# Patient Record
Sex: FEMALE | Race: WHITE | NOT HISPANIC OR LATINO | URBAN - METROPOLITAN AREA
[De-identification: names, ages, dates, MRNs, and addresses within clinical notes are randomized per-mention and may not be internally consistent; named-entity substitution may affect disease eponyms.]

---

## 2017-08-11 ENCOUNTER — APPOINTMENT (RX ONLY)
Dept: URBAN - METROPOLITAN AREA CLINIC 12 | Facility: CLINIC | Age: 67
Setting detail: DERMATOLOGY
End: 2017-08-11

## 2017-08-11 DIAGNOSIS — Z41.9 ENCOUNTER FOR PROCEDURE FOR PURPOSES OTHER THAN REMEDYING HEALTH STATE, UNSPECIFIED: ICD-10-CM

## 2017-08-11 PROCEDURE — ? BOTOX

## 2017-08-11 PROCEDURE — ? FILLERS

## 2017-08-11 ASSESSMENT — LOCATION DETAILED DESCRIPTION DERM
LOCATION DETAILED: RIGHT INFERIOR MEDIAL MALAR CHEEK
LOCATION DETAILED: LEFT CENTRAL MALAR CHEEK
LOCATION DETAILED: RIGHT MEDIAL FOREHEAD
LOCATION DETAILED: RIGHT MEDIAL BUCCAL CHEEK
LOCATION DETAILED: LEFT INFERIOR MEDIAL MALAR CHEEK
LOCATION DETAILED: LEFT SUPERIOR PREAURICULAR CHEEK
LOCATION DETAILED: RIGHT CENTRAL MALAR CHEEK
LOCATION DETAILED: LEFT MEDIAL BUCCAL CHEEK
LOCATION DETAILED: RIGHT SUPERIOR PREAURICULAR CHEEK

## 2017-08-11 ASSESSMENT — LOCATION SIMPLE DESCRIPTION DERM
LOCATION SIMPLE: RIGHT FOREHEAD
LOCATION SIMPLE: LEFT CHEEK
LOCATION SIMPLE: RIGHT CHEEK

## 2017-08-11 ASSESSMENT — LOCATION ZONE DERM: LOCATION ZONE: FACE

## 2017-08-11 NOTE — PROCEDURE: FILLERS
Marionette Lines Filler  Volume In Cc: 0
Filler: Restylane-L
Consent: Written consent obtained. Risks include but not limited to bruising, beading, irregular texture, ulceration, infection, allergic reaction, scar formation, incomplete augmentation, temporary nature, procedural pain.
Lot #: [id number]
Additional Area 3 Location: Upper cutaneous lip
Additional Area 1 Location: inner canthus
Post-Care Instructions: Patient instructed to apply ice to reduce swelling.
Filler: Voluma
Expiration Date (Month Year): 9/2017
Nasolabial Folds Filler Volume In Cc: 1
Detail Level: Simple
Cheeks Filler Volume In Cc: 2
Topical Anesthesia?: BLT cream (benzocaine 20%, lidocaine 6%, tetracaine 4%)
Lot #: [id number]
Map Statment: See Attach Map for Complete Details
Use Map Statement For Sites (Optional): No
Lot #: [id number]
Additional Area 2 Location: Oral commisseurs
Expiration Date (Month Year): 12-6-2018
Anesthesia Type: 0.2% lidocaine (mixed within filler)
Additional Area 4 Location: Nose scar
Anesthesia Volume In Cc: 0.3
Expiration Date (Month Year): 7/2018

## 2017-08-11 NOTE — PROCEDURE: BOTOX
Additional Area 6 Units: 0
Forehead Units: 52
Lot #: [id number]
Dilution (U/0.1 Cc): 4
Post-Care Instructions: Patient instructed to not lie down for 4 hours and limit physical activity for 24 hours. Patient instructed not to travel by airplane for 48 hours.
Price (Use Numbers Only, No Special Characters Or $): 13
Detail Level: Simple
Expiration Date (Month Year): 03/2020
Consent: Written consent obtained. Risks include but not limited to lid/brow ptosis, bruising, swelling, diplopia, temporary effect, incomplete chemical denervation.

## 2019-05-09 ENCOUNTER — APPOINTMENT (RX ONLY)
Dept: URBAN - METROPOLITAN AREA CLINIC 12 | Facility: CLINIC | Age: 69
Setting detail: DERMATOLOGY
End: 2019-05-09

## 2019-05-09 DIAGNOSIS — Z41.9 ENCOUNTER FOR PROCEDURE FOR PURPOSES OTHER THAN REMEDYING HEALTH STATE, UNSPECIFIED: ICD-10-CM

## 2019-05-09 PROCEDURE — ? BOTOX

## 2019-05-09 PROCEDURE — ? FILLERS

## 2019-05-09 ASSESSMENT — LOCATION ZONE DERM
LOCATION ZONE: LIP
LOCATION ZONE: FACE

## 2019-05-09 ASSESSMENT — LOCATION DETAILED DESCRIPTION DERM
LOCATION DETAILED: LEFT LOWER CUTANEOUS LIP
LOCATION DETAILED: RIGHT INFERIOR MEDIAL MALAR CHEEK
LOCATION DETAILED: LEFT INFERIOR MEDIAL MALAR CHEEK
LOCATION DETAILED: LEFT CENTRAL MALAR CHEEK
LOCATION DETAILED: RIGHT SUPERIOR MEDIAL FOREHEAD
LOCATION DETAILED: RIGHT LOWER CUTANEOUS LIP
LOCATION DETAILED: RIGHT CENTRAL MALAR CHEEK

## 2019-05-09 ASSESSMENT — LOCATION SIMPLE DESCRIPTION DERM
LOCATION SIMPLE: RIGHT LIP
LOCATION SIMPLE: RIGHT CHEEK
LOCATION SIMPLE: RIGHT FOREHEAD
LOCATION SIMPLE: LEFT LIP
LOCATION SIMPLE: LEFT CHEEK

## 2019-05-09 NOTE — PROCEDURE: BOTOX
Additional Area 3 Units: 0
Post-Care Instructions: Patient instructed to not lie down for 4 hours and limit physical activity for 24 hours. Patient instructed not to travel by airplane for 48 hours.
Consent: Written consent obtained. Risks include but not limited to lid/brow ptosis, bruising, swelling, diplopia, temporary effect, incomplete chemical denervation.
Additional Area 1 Location: crows feet
Detail Level: Zone
Additional Area 2 Location: neck
Price (Use Numbers Only, No Special Characters Or $): 13
Lot #: [id number]
Forehead Units: 48
Dilution (U/0.1 Cc): 2.5
Expiration Date (Month Year): 10/21

## 2019-05-09 NOTE — PROCEDURE: FILLERS
Mid Face Filler  Volume In Cc: 0
Include Cannula Information In Note?: No
Post-Care Instructions: Patient instructed to apply ice to reduce swelling.
Consent: Written consent obtained. Risks include but not limited to bruising, beading, irregular texture, ulceration, infection, allergic reaction, scar formation, incomplete augmentation, temporary nature, procedural pain.
Additional Area 2 Location: lower lip
Filler Comments: 15% discount honored cosmetic day
Filler: Radiesse
Additional Area 1 Location: upper lip
Detail Level: Simple
Lot #: [id number]
Nasolabial Folds Filler Volume In Cc: 2
Anesthesia Volume In Cc: 0.5
Expiration Date (Month Year): 01-
Expiration Date (Month Year): 2020-03-18
Lot #: [id number]
Anesthesia Type: 1% lidocaine without epinephrine
Lot #: [id number]
Expiration Date (Month Year): 2021-06-28
Cheeks Filler Volume In Cc: 1
Additional Area 4 Location: Nose scar
Map Statment: See Attach Map for Complete Details
Additional Area 1 Location: upper cutaneous lip
Filler: Voluma

## 2021-09-23 ENCOUNTER — APPOINTMENT (RX ONLY)
Dept: URBAN - METROPOLITAN AREA CLINIC 12 | Facility: CLINIC | Age: 71
Setting detail: DERMATOLOGY
End: 2021-09-23

## 2021-09-23 DIAGNOSIS — Z41.9 ENCOUNTER FOR PROCEDURE FOR PURPOSES OTHER THAN REMEDYING HEALTH STATE, UNSPECIFIED: ICD-10-CM

## 2021-09-23 PROCEDURE — ? OTHER

## 2021-09-23 PROCEDURE — ? BOTOX

## 2021-09-23 PROCEDURE — ? FILLERS

## 2021-09-23 ASSESSMENT — LOCATION DETAILED DESCRIPTION DERM
LOCATION DETAILED: LEFT CENTRAL MALAR CHEEK
LOCATION DETAILED: RIGHT UPPER CUTANEOUS LIP
LOCATION DETAILED: LEFT LOWER CUTANEOUS LIP
LOCATION DETAILED: RIGHT INFERIOR LATERAL BUCCAL CHEEK
LOCATION DETAILED: LEFT CHIN
LOCATION DETAILED: RIGHT LOWER CUTANEOUS LIP
LOCATION DETAILED: LEFT UPPER CUTANEOUS LIP
LOCATION DETAILED: RIGHT CENTRAL MALAR CHEEK

## 2021-09-23 ASSESSMENT — LOCATION SIMPLE DESCRIPTION DERM
LOCATION SIMPLE: LEFT CHEEK
LOCATION SIMPLE: CHIN
LOCATION SIMPLE: RIGHT LIP
LOCATION SIMPLE: RIGHT CHEEK
LOCATION SIMPLE: LEFT LIP

## 2021-09-23 ASSESSMENT — LOCATION ZONE DERM
LOCATION ZONE: LIP
LOCATION ZONE: FACE

## 2021-09-23 NOTE — PROCEDURE: OTHER
Detail Level: Zone
Other (Free Text): 15% discount honored for cosmetic event day
Note Text (......Xxx Chief Complaint.): This diagnosis correlates with the
Render Risk Assessment In Note?: no

## 2021-09-23 NOTE — PROCEDURE: BOTOX
Show Levator Superior Units: Yes
Mentalis Units: 0
Detail Level: Simple
Lot #: [id number]
Expiration Date (Month Year): 11/2023
Show Right And Left Pupillary Line Units: No
Consent: Written consent obtained. Risks include but not limited to lid/brow ptosis, bruising, swelling, diplopia, temporary effect, incomplete chemical denervation.
Forehead Units: 48
Additional Area 2 Location: neck
Additional Area 1 Location: chin
Dilution (U/0.1 Cc): 4
Post-Care Instructions: Patient instructed to not lie down for 4 hours and limit physical activity for 24 hours. Patient instructed not to travel by airplane for 48 hours.

## 2021-09-23 NOTE — PROCEDURE: FILLERS
Jawline Filler Volume In Cc: 0
Use Map Statement For Sites (Optional): No
Filler: Radiesse
Lot #: [id number]
Jawline Filler Volume In Cc: 0.5
Nasolabial Folds Filler Volume In Cc: 1
Expiration Date (Month Year): 2022-05-17
Map Statment: See Attach Map for Complete Details
Lot #: [id number]
Topical Anesthesia?: BLT ointment (benzocaine 20%, lidocaine 10%, tetracaine 10%)
Detail Level: Zone
Additional Area 1 Location: upper lip
Expiration Date (Month Year): 2024/04/08
Lot #: [id number]
Additional Area 2 Location: lower lip
Anesthesia Expiration Date (Month Year): 09/2021
Filler: Juvederm Voluma XC
Additional Area 3 Location: chin
Consent: Written consent obtained. Risks include but not limited to bruising, beading, irregular texture, ulceration, infection, allergic reaction, scar formation, incomplete augmentation, temporary nature, procedural pain.
Post-Care Instructions: Patient instructed to apply ice to reduce swelling.
Expiration Date (Month Year): 01-

## 2021-09-30 ENCOUNTER — APPOINTMENT (RX ONLY)
Dept: URBAN - METROPOLITAN AREA CLINIC 12 | Facility: CLINIC | Age: 71
Setting detail: DERMATOLOGY
End: 2021-09-30

## 2021-09-30 DIAGNOSIS — Z41.9 ENCOUNTER FOR PROCEDURE FOR PURPOSES OTHER THAN REMEDYING HEALTH STATE, UNSPECIFIED: ICD-10-CM

## 2021-09-30 PROCEDURE — ? FILLERS

## 2021-09-30 PROCEDURE — ? OTHER

## 2021-09-30 ASSESSMENT — LOCATION SIMPLE DESCRIPTION DERM
LOCATION SIMPLE: RIGHT LIP
LOCATION SIMPLE: LEFT CHEEK
LOCATION SIMPLE: LEFT CHEEK
LOCATION SIMPLE: RIGHT CHEEK

## 2021-09-30 ASSESSMENT — LOCATION DETAILED DESCRIPTION DERM
LOCATION DETAILED: LEFT MEDIAL BUCCAL CHEEK
LOCATION DETAILED: RIGHT MEDIAL BUCCAL CHEEK
LOCATION DETAILED: RIGHT LOWER CUTANEOUS LIP
LOCATION DETAILED: LEFT MEDIAL BUCCAL CHEEK

## 2021-09-30 ASSESSMENT — LOCATION ZONE DERM
LOCATION ZONE: LIP
LOCATION ZONE: FACE
LOCATION ZONE: FACE

## 2021-09-30 NOTE — PROCEDURE: FILLERS
Map Statment: See Attach Map for Complete Details
Additional Area 4 Volume In Cc: 0
Include Cannula Information In Note?: No
Expiration Date (Month Year): 01-
Post-Care Instructions: Patient instructed to apply ice to reduce swelling.
Lot #: [id number]
Lot #: [id number]
Topical Anesthesia?: BLT gel (benzocaine 20%, lidocaine 10%, tetracaine 10%)
Expiration Date (Month Year): 05/09/2022
Additional Area 1 Location: upper lip
Expiration Date (Month Year): 2021/12/22
Detail Level: Zone
Filler: Juvederm Ultra Plus XC
Price (Use Numbers Only, No Special Characters Or $): 649
Lot #: [id number]
Additional Area 2 Location: lower lip
Nasolabial Folds Filler Volume In Cc: 1
Additional Area 3 Location: chin
Consent: Written consent obtained. Risks include but not limited to bruising, beading, irregular texture, ulceration, infection, allergic reaction, scar formation, incomplete augmentation, temporary nature, procedural pain.

## 2021-09-30 NOTE — PROCEDURE: OTHER
Render Risk Assessment In Note?: no
Note Text (......Xxx Chief Complaint.): This diagnosis correlates with the
Other (Free Text): 15% discount honored for cosmetic event
Detail Level: Zone

## 2021-11-18 ENCOUNTER — APPOINTMENT (RX ONLY)
Dept: URBAN - METROPOLITAN AREA CLINIC 12 | Facility: CLINIC | Age: 71
Setting detail: DERMATOLOGY
End: 2021-11-18

## 2021-11-18 DIAGNOSIS — Z41.9 ENCOUNTER FOR PROCEDURE FOR PURPOSES OTHER THAN REMEDYING HEALTH STATE, UNSPECIFIED: ICD-10-CM

## 2021-11-18 PROCEDURE — ? BOTOX

## 2021-11-18 PROCEDURE — ? FILLERS

## 2021-11-18 ASSESSMENT — LOCATION SIMPLE DESCRIPTION DERM
LOCATION SIMPLE: INFERIOR FOREHEAD
LOCATION SIMPLE: RIGHT CHEEK
LOCATION SIMPLE: LEFT CHEEK

## 2021-11-18 ASSESSMENT — LOCATION DETAILED DESCRIPTION DERM
LOCATION DETAILED: INFERIOR MID FOREHEAD
LOCATION DETAILED: LEFT CENTRAL BUCCAL CHEEK
LOCATION DETAILED: RIGHT CENTRAL BUCCAL CHEEK
LOCATION DETAILED: RIGHT LATERAL MALAR CHEEK
LOCATION DETAILED: LEFT SUPERIOR LATERAL MALAR CHEEK
LOCATION DETAILED: LEFT LATERAL BUCCAL CHEEK
LOCATION DETAILED: RIGHT CENTRAL MALAR CHEEK
LOCATION DETAILED: LEFT LATERAL MALAR CHEEK

## 2021-11-18 ASSESSMENT — LOCATION ZONE DERM: LOCATION ZONE: FACE

## 2021-11-18 NOTE — PROCEDURE: BOTOX
Glabellar Complex Units: 0
Expiration Date (Month Year): 02/2024
Show Glabellar Units: Yes
Show Mentalis Units: No
Additional Area 1 Location: chin
Detail Level: Simple
Dilution (U/0.1 Cc): 4
Post-Care Instructions: Patient instructed to not lie down for 4 hours and limit physical activity for 24 hours. Patient instructed not to travel by airplane for 48 hours.
Additional Area 2 Location: neck
Lot #: [id number]
Forehead Units: 48
Consent: Written consent obtained. Risks include but not limited to lid/brow ptosis, bruising, swelling, diplopia, temporary effect, incomplete chemical denervation.
Topical Anesthesia?: BLT cream (benzocaine 20%, lidocaine 10%, tetracaine 10%)

## 2021-11-18 NOTE — PROCEDURE: FILLERS
Vermilion Lips Filler Volume In Cc: 0
Lot #: [id number]
Include Cannula Information In Note?: No
Expiration Date (Month Year): 2022-10-17
Marionette Lines Filler  Volume In Cc: 1
Lot #: [id number]
Consent: Written consent obtained. Risks include but not limited to bruising, beading, irregular texture, ulceration, infection, allergic reaction, scar formation, incomplete augmentation, temporary nature, procedural pain.
Lot #: [id number]
Post-Care Instructions: Patient instructed to apply ice to reduce swelling.
Map Statment: See Attach Map for Complete Details
Topical Anesthesia?: BLT gel (benzocaine 20%, lidocaine 10%, tetracaine 10%)
Expiration Date (Month Year): 2022-11-10
Expiration Date (Month Year): 2022-07-26
Additional Area 1 Location: upper lip
Include Cannula Information In Note?: Yes
Additional Area 2 Location: lower lip
Include Cannula Size?: 25G
Filler: Juvederm Voluma XC
Additional Area 1 Location: chin
Include Cannula Length?: 1.5 inch
Detail Level: Zone
Filler: Juvederm Ultra Plus XC

## 2022-03-28 ENCOUNTER — APPOINTMENT (RX ONLY)
Dept: URBAN - METROPOLITAN AREA CLINIC 12 | Facility: CLINIC | Age: 72
Setting detail: DERMATOLOGY
End: 2022-03-28

## 2022-03-28 DIAGNOSIS — Z41.9 ENCOUNTER FOR PROCEDURE FOR PURPOSES OTHER THAN REMEDYING HEALTH STATE, UNSPECIFIED: ICD-10-CM

## 2022-03-28 PROCEDURE — ? FILLERS

## 2022-03-28 PROCEDURE — ? BOTOX

## 2022-03-28 ASSESSMENT — LOCATION DETAILED DESCRIPTION DERM
LOCATION DETAILED: SUPERIOR MID FOREHEAD
LOCATION DETAILED: RIGHT INFERIOR MEDIAL MALAR CHEEK
LOCATION DETAILED: RIGHT CENTRAL MALAR CHEEK
LOCATION DETAILED: LEFT CENTRAL MALAR CHEEK
LOCATION DETAILED: LEFT UPPER CUTANEOUS LIP

## 2022-03-28 ASSESSMENT — LOCATION ZONE DERM
LOCATION ZONE: LIP
LOCATION ZONE: FACE

## 2022-03-28 ASSESSMENT — LOCATION SIMPLE DESCRIPTION DERM
LOCATION SIMPLE: SUPERIOR FOREHEAD
LOCATION SIMPLE: LEFT CHEEK
LOCATION SIMPLE: RIGHT CHEEK
LOCATION SIMPLE: LEFT LIP

## 2022-03-28 NOTE — PROCEDURE: FILLERS
Tear Troughs Filler  Volume In Cc: 0
Include Cannula Information In Note?: Yes
Include Cannula Length?: 1.5 inch
Additional Area 3 Location: glabella
Filler: Juvederm Ultra Plus XC
Filler: Juvederm Voluma XC
Detail Level: Zone
Include Cannula Size?: 25G
Cheeks Filler Volume In Cc: 1
Lot #: [id number]
Expiration Date (Month Year): 2023/01/25
Consent: Written consent obtained. Risks include but not limited to bruising, beading, irregular texture, ulceration, infection, allergic reaction, scar formation, incomplete augmentation, temporary nature, procedural pain.
Price (Use Numbers Only, No Special Characters Or $): 1200
Lot #: [id number]
Post-Care Instructions: Patient instructed to apply ice to reduce swelling.
Include Cannula Information In Note?: No
Expiration Date (Month Year): 2022/07/26
Additional Area 1 Location: upper lip
Additional Area 2 Location: jawline
Additional Area 1 Location: chin
Lot #: [id number]
Map Statment: See Attach Map for Complete Details
Additional Area 3 Location: nasal dorsum
Expiration Date (Month Year): 2022-11-10

## 2022-03-28 NOTE — PROCEDURE: BOTOX
Show Anterior Platysmal Band Units: Yes
Left Periorbital Units: 0
Additional Area 1 Location: chin
Forehead Units: 48
Post-Care Instructions: Patient instructed to not lie down for 4 hours and limit physical activity for 24 hours. Patient instructed not to travel by airplane for 48 hours.
Expiration Date (Month Year): 04/2024
Show Ucl Units: No
Detail Level: Simple
Lot #: [id number]
Price (Use Numbers Only, No Special Characters Or $): 13.00
Dilution (U/0.1 Cc): 2.5
Consent: Written consent obtained. Risks include but not limited to lid/brow ptosis, bruising, swelling, diplopia, temporary effect, incomplete chemical denervation.
Additional Area 2 Location: neck

## 2022-06-14 ENCOUNTER — APPOINTMENT (RX ONLY)
Dept: URBAN - METROPOLITAN AREA CLINIC 12 | Facility: CLINIC | Age: 72
Setting detail: DERMATOLOGY
End: 2022-06-14

## 2022-06-14 DIAGNOSIS — Z41.9 ENCOUNTER FOR PROCEDURE FOR PURPOSES OTHER THAN REMEDYING HEALTH STATE, UNSPECIFIED: ICD-10-CM

## 2022-06-14 PROCEDURE — ? OTHER

## 2022-06-14 PROCEDURE — ? BOTOX

## 2022-06-14 PROCEDURE — ? FILLERS

## 2022-06-14 ASSESSMENT — LOCATION DETAILED DESCRIPTION DERM: LOCATION DETAILED: INFERIOR MID FOREHEAD

## 2022-06-14 ASSESSMENT — LOCATION ZONE DERM: LOCATION ZONE: FACE

## 2022-06-14 ASSESSMENT — LOCATION SIMPLE DESCRIPTION DERM: LOCATION SIMPLE: INFERIOR FOREHEAD

## 2022-06-14 NOTE — PROCEDURE: FILLERS
Include Cannula Length?: 1.5 inch
Jawline Filler Volume In Cc: 0
Price (Use Numbers Only, No Special Characters Or $): 786
Additional Area 1 Location: chin
Include Cannula Information In Note?: No
Consent: Written consent obtained. Risks include but not limited to bruising, beading, irregular texture, ulceration, infection, allergic reaction, scar formation, incomplete augmentation, temporary nature, procedural pain.
Include Cannula Size?: 25G
Use Map Statement For Sites (Optional): Yes
Additional Area 2 Location: upper lip
Anesthesia Type: 1% lidocaine with 1:100,000 epinephrine and 408mcg clindamycin/ml and a 1:10 solution of 8.4% sodium bicarbonate
Post-Care Instructions: Patient instructed to apply ice to reduce swelling.
Map Statment: See Attach Map for Complete Details
Additional Area 3 Location: glabella
Filler: Juvederm Voluma XC
Lateral Face Filler  Volume In Cc: 1
Anesthesia Volume In Cc: 3
Filler: Juvederm Ultra XC
Lot #: [id number]
Expiration Date (Month Year): 2023-03-17
Aspiration Statement: Aspiration was performed prior to injecting site with filler.
Lot #: [id number]
Additional Area 2 Location: jawline
Lot #: [id number]
Detail Level: Zone
Additional Area 3 Location: nasal dorsum
Expiration Date (Month Year): 2022-12-22

## 2022-06-14 NOTE — PROCEDURE: BOTOX
Additional Area 2 Units: 0
Show Anterior Platysmal Band Units: Yes
Detail Level: Zone
Additional Area 1 Location: chin
Consent: Written consent obtained. Risks include but not limited to lid/brow ptosis, bruising, swelling, diplopia, temporary effect, incomplete chemical denervation.
Show Lcl Units: No
Additional Area 2 Location: neck
Forehead Units: 54
Expiration Date (Month Year): 04/2024
Lot #: [id number]
Dilution (U/0.1 Cc): 2.5
Post-Care Instructions: Patient instructed to not lie down for 4 hours and limit physical activity for 24 hours. Patient instructed not to travel by airplane for 48 hours.

## 2022-06-14 NOTE — PROCEDURE: OTHER
Other (Free Text): $12/ unit honored
Render Risk Assessment In Note?: no
Note Text (......Xxx Chief Complaint.): This diagnosis correlates with the
Detail Level: Zone

## 2022-08-19 ENCOUNTER — APPOINTMENT (RX ONLY)
Dept: URBAN - METROPOLITAN AREA CLINIC 12 | Facility: CLINIC | Age: 72
Setting detail: DERMATOLOGY
End: 2022-08-19

## 2022-08-19 DIAGNOSIS — Z41.9 ENCOUNTER FOR PROCEDURE FOR PURPOSES OTHER THAN REMEDYING HEALTH STATE, UNSPECIFIED: ICD-10-CM

## 2022-08-19 PROCEDURE — ? FILLERS

## 2022-08-19 PROCEDURE — ? BOTOX

## 2022-08-19 NOTE — PROCEDURE: BOTOX
Post-Care Instructions: Patient instructed to not lie down for 4 hours and limit physical activity for 24 hours. Patient instructed not to travel by airplane for 48 hours.
Detail Level: Zone
Show Lateral Platysmal Band Units: Yes
Additional Area 5 Units: 0
Show Lcl Units: No
Additional Area 1 Location: chin
Expiration Date (Month Year): 03/2024
Lot #: [id number]
Price (Use Numbers Only, No Special Characters Or $): 182
Additional Area 2 Location: neck
Dilution (U/0.1 Cc): 2.5
Forehead Units: 52
Consent: Written consent obtained. Risks include but not limited to lid/brow ptosis, bruising, swelling, diplopia, temporary effect, incomplete chemical denervation.

## 2022-08-19 NOTE — PROCEDURE: FILLERS
Decollete Filler  Volume In Cc: 0
Include Documentation That Aspiration Was Performed Prior To Injecting Filler:: Yes
Lot #: [id number]
Additional Area 1 Location: Chin
Include Cannula Size?: 25G
Expiration Date (Month Year): 2023/07/22
Additional Area 2 Location: jawline
Lot #: [id number]
Filler: Juvederm Voluma XC
Additional Area 3 Location: nasal dorsum
Expiration Date (Month Year): 2022-11-10
Include Cannula Length?: 1.5 inch
Include Cannula Information In Note?: No
Detail Level: Zone
Cheeks Filler Volume In Cc: 1
Aspiration Statement: Aspiration was performed prior to injecting site with filler.
Additional Area 4 Location: upper lip
Consent: Written consent obtained. Risks include but not limited to bruising, beading, irregular texture, ulceration, infection, allergic reaction, scar formation, incomplete augmentation, temporary nature, procedural pain.
Price (Use Numbers Only, No Special Characters Or $): 557
Post-Care Instructions: Patient instructed to apply ice to reduce swelling.
Filler: Juvederm Ultra Plus XC
Lot #: [id number]
Additional Area 3 Location: glabella
Expiration Date (Month Year): 07/23/2023
Map Statment: See Attach Map for Complete Details

## 2022-11-11 ENCOUNTER — APPOINTMENT (RX ONLY)
Dept: URBAN - METROPOLITAN AREA CLINIC 12 | Facility: CLINIC | Age: 72
Setting detail: DERMATOLOGY
End: 2022-11-11

## 2022-11-11 DIAGNOSIS — L98.8 OTHER SPECIFIED DISORDERS OF THE SKIN AND SUBCUTANEOUS TISSUE: ICD-10-CM

## 2022-11-11 DIAGNOSIS — Z41.9 ENCOUNTER FOR PROCEDURE FOR PURPOSES OTHER THAN REMEDYING HEALTH STATE, UNSPECIFIED: ICD-10-CM

## 2022-11-11 PROCEDURE — ? BOTOX

## 2022-11-11 PROCEDURE — ? FILLERS

## 2022-11-11 ASSESSMENT — LOCATION ZONE DERM
LOCATION ZONE: NOSE
LOCATION ZONE: LIP
LOCATION ZONE: EYELID
LOCATION ZONE: FACE

## 2022-11-11 ASSESSMENT — LOCATION DETAILED DESCRIPTION DERM
LOCATION DETAILED: RIGHT UPPER CUTANEOUS LIP
LOCATION DETAILED: NASAL ROOT
LOCATION DETAILED: RIGHT INFERIOR CENTRAL BUCCAL CHEEK
LOCATION DETAILED: LEFT LATERAL BUCCAL CHEEK
LOCATION DETAILED: LEFT LATERAL EYEBROW
LOCATION DETAILED: GLABELLA
LOCATION DETAILED: LEFT UPPER CUTANEOUS LIP
LOCATION DETAILED: RIGHT SUPERIOR MEDIAL BUCCAL CHEEK
LOCATION DETAILED: SUPERIOR MID FOREHEAD
LOCATION DETAILED: RIGHT LATERAL CANTHUS
LOCATION DETAILED: LEFT LATERAL CANTHUS
LOCATION DETAILED: LEFT INFERIOR MEDIAL MALAR CHEEK
LOCATION DETAILED: LEFT CHIN
LOCATION DETAILED: RIGHT LATERAL EYEBROW

## 2022-11-11 ASSESSMENT — LOCATION SIMPLE DESCRIPTION DERM
LOCATION SIMPLE: CHIN
LOCATION SIMPLE: GLABELLA
LOCATION SIMPLE: LEFT EYELID
LOCATION SIMPLE: LEFT CHEEK
LOCATION SIMPLE: RIGHT LIP
LOCATION SIMPLE: RIGHT EYELID
LOCATION SIMPLE: LEFT EYEBROW
LOCATION SIMPLE: RIGHT CHEEK
LOCATION SIMPLE: NOSE
LOCATION SIMPLE: LEFT LIP
LOCATION SIMPLE: SUPERIOR FOREHEAD
LOCATION SIMPLE: RIGHT EYEBROW

## 2022-11-11 NOTE — PROCEDURE: FILLERS
Tear Troughs Filler  Volume In Cc: 0
Lot #: [id number]
Aspiration Statement: Aspiration was performed prior to injecting site with filler.
Additional Area 1 Location: glabella
Expiration Date (Month Year): 6/22/23
Include Cannula Information In Note?: No
Include Cannula Information In Note?: Yes
Additional Area 2 Location: Forehead
Include Cannula Size?: 25G
Detail Level: Simple
Filler: Juvederm Voluma XC
Expiration Date (Month Year): 4/30/22
Additional Area 3 Location: hands
Include Cannula Length?: 1.5 inch
Price (Use Numbers Only, No Special Characters Or $): 3700
Additional Area 1 Location: chin
Lot #: [id number]
Additional Area 4 Volume In Cc: 0.5
Nasolabial Folds Filler Volume In Cc: 1
Additional Area 5 Location: Upper Cutaneous Lip
Anesthesia Type: 1% lidocaine without epinephrine
Filler: Juvederm Vollure XC
Lot #: [id number]
Map Statment: See attached Map
Expiration Date (Month Year): 8/4/23

## 2022-11-11 NOTE — PROCEDURE: BOTOX
Show Additional Area 2: Yes
Left Periorbital Units: 0
Show Lcl Units: No
Additional Area 5 Location: Ailin
Expiration Date (Month Year): 08/2024
Forehead Units: 10
Glabellar Complex Units: 16
Additional Area 1 Location: Eyebrows
Price (Use Numbers Only, No Special Characters Or $): 12
Additional Area 6 Location: Chin Zendejas
Consent: Written consent obtained. Risks include but not limited to lid/brow ptosis, bruising, swelling, diplopia, temporary effect, incomplete chemical denervation.
Detail Level: Simple
Post-Care Instructions: Patient instructed to not lie down for 4 hours and limit physical activity for 24 hours. Patient instructed not to travel by airplane for 48 hours.
Additional Area 2 Location: Neck
Additional Area 6 Units: 4
Lot #: [id number]
Additional Area 3 Location: upper cutaneous lip
Periorbital Skin Units: 20
Dilution (U/0.1 Cc): 2.5
Reconstitution Date (Optional): 11/11/2022
Additional Area 4 Location: Chin

## 2023-03-16 ENCOUNTER — APPOINTMENT (RX ONLY)
Dept: URBAN - METROPOLITAN AREA CLINIC 12 | Facility: CLINIC | Age: 73
Setting detail: DERMATOLOGY
End: 2023-03-16

## 2023-03-16 DIAGNOSIS — L98.8 OTHER SPECIFIED DISORDERS OF THE SKIN AND SUBCUTANEOUS TISSUE: ICD-10-CM

## 2023-03-16 DIAGNOSIS — Z41.9 ENCOUNTER FOR PROCEDURE FOR PURPOSES OTHER THAN REMEDYING HEALTH STATE, UNSPECIFIED: ICD-10-CM

## 2023-03-16 PROCEDURE — ? OTHER

## 2023-03-16 PROCEDURE — ? BOTOX

## 2023-03-16 PROCEDURE — ? DYSPORT

## 2023-03-16 PROCEDURE — ? FILLERS

## 2023-03-16 ASSESSMENT — LOCATION ZONE DERM
LOCATION ZONE: FACE
LOCATION ZONE: EYELID
LOCATION ZONE: NOSE
LOCATION ZONE: LIP

## 2023-03-16 ASSESSMENT — LOCATION DETAILED DESCRIPTION DERM
LOCATION DETAILED: RIGHT LATERAL CANTHUS
LOCATION DETAILED: LEFT INFERIOR MEDIAL MALAR CHEEK
LOCATION DETAILED: LEFT LOWER CUTANEOUS LIP
LOCATION DETAILED: LEFT FOREHEAD
LOCATION DETAILED: RIGHT CENTRAL MALAR CHEEK
LOCATION DETAILED: RIGHT LOWER CUTANEOUS LIP
LOCATION DETAILED: LEFT CENTRAL MALAR CHEEK
LOCATION DETAILED: GLABELLA
LOCATION DETAILED: LEFT LATERAL CANTHUS
LOCATION DETAILED: RIGHT NASOLABIAL FOLD
LOCATION DETAILED: RIGHT FOREHEAD
LOCATION DETAILED: NASAL ROOT

## 2023-03-16 ASSESSMENT — LOCATION SIMPLE DESCRIPTION DERM
LOCATION SIMPLE: GLABELLA
LOCATION SIMPLE: RIGHT EYELID
LOCATION SIMPLE: RIGHT FOREHEAD
LOCATION SIMPLE: LEFT CHEEK
LOCATION SIMPLE: LEFT EYELID
LOCATION SIMPLE: NOSE
LOCATION SIMPLE: LEFT FOREHEAD
LOCATION SIMPLE: RIGHT CHEEK
LOCATION SIMPLE: RIGHT LIP
LOCATION SIMPLE: LEFT LIP

## 2023-03-16 NOTE — PROCEDURE: FILLERS
Additional Area 1 Volume In Cc: 0
Price (Use Numbers Only, No Special Characters Or $): 0119
Expiration Date (Month Year): 11/12/23
Nasolabial Folds Filler Volume In Cc: 0.6
Include Cannula Information In Note?: No
Lot #: [id number]
Use Map Statement For Sites (Optional): Yes
Lot #: [id number]
Marionette Lines Filler  Volume In Cc: 0.4
Additional Area 1 Location: glabella
Expiration Date (Month Year): 4/30/22
Include Cannula Size?: 25G
Expiration Date (Month Year): 12/30/23
Map Statment: See attached Map
Include Cannula Length?: 1.5 inch
Additional Area 2 Location: Forehead
Additional Area 1 Location: Upper Cutaneous Lip
Filler: Juvederm Voluma XC
Additional Area 3 Location: hands
Additional Area 1 Location: chin
Cheeks Filler Volume In Cc: 2
Filler: Juvederm Vollure XC
Aspiration Statement: Aspiration was performed prior to injecting site with filler.
Detail Level: Simple
Lot #: [id number]

## 2023-03-16 NOTE — PROCEDURE: OTHER
Render Risk Assessment In Note?: yes
Other (Free Text): Discussed Volux for her jawline at a future appointment. No price discussed but patient scheduled for May.
Detail Level: Simple
Note Text (......Xxx Chief Complaint.): This diagnosis correlates with the

## 2023-03-16 NOTE — PROCEDURE: BOTOX
Lcl Root Units: 0
Show Periorbital Units: Yes
Show Ucl Units: No
Additional Area 6 Units: 4
Additional Area 4 Location: Chin
Post-Care Instructions: Patient instructed to not lie down for 4 hours and limit physical activity for 24 hours. Patient instructed not to travel by airplane for 48 hours.
Incrementing Botox Units: By 0.5 Units
Detail Level: Simple
Additional Area 4 Units: 12
Additional Area 3 Location: lip
Additional Area 2 Location: Neck
Lot #: [id number]
Expiration Date (Month Year): 05/2025
Additional Area 6 Location: Chin Zendejas
Price (Use Numbers Only, No Special Characters Or $): 13
Additional Area 1 Location: Eyebrows
Dilution (U/0.1 Cc): 2.5
Consent: Written consent obtained. Risks include but not limited to lid/brow ptosis, bruising, swelling, diplopia, temporary effect, incomplete chemical denervation.
Additional Area 5 Location: Ailin
Glabellar Complex Units: 16

## 2023-03-16 NOTE — PROCEDURE: DYSPORT
Show Additional Area 2: Yes
Dilution (U/ 0.1cc): 1.5
Additional Area 2 Units: 0
Show Right And Left Periorbital Units: No
Post-Care Instructions: Patient instructed to not lie down for 4 hours and limit physical activity for 24 hours. Patient instructed not to travel by airplane for 48 hours.
Additional Area 3 Location: Neck
Additional Area 1 Location: chin
Expiration Date (Month Year): 08/31/2023
Additional Area 6 Location: around Mouth
Price (Use Numbers Only, No Special Characters Or $): 4.15
Detail Level: Simple
Additional Area 4 Location: Bunny Lines
Lot #: [id number]
Additional Area 2 Location: eyebrows
Consent: Written consent obtained. Risks include but not limited to lid/brow ptosis, bruising, swelling, diplopia, temporary effect, incomplete chemical denervation.
Additional Area 5 Location: Right zygoma
Periorbital Skin Units: 60

## 2023-05-25 ENCOUNTER — APPOINTMENT (RX ONLY)
Dept: URBAN - METROPOLITAN AREA CLINIC 12 | Facility: CLINIC | Age: 73
Setting detail: DERMATOLOGY
End: 2023-05-25

## 2023-05-25 DIAGNOSIS — Z41.9 ENCOUNTER FOR PROCEDURE FOR PURPOSES OTHER THAN REMEDYING HEALTH STATE, UNSPECIFIED: ICD-10-CM

## 2023-05-25 DIAGNOSIS — L98.8 OTHER SPECIFIED DISORDERS OF THE SKIN AND SUBCUTANEOUS TISSUE: ICD-10-CM

## 2023-05-25 PROCEDURE — ? OTHER

## 2023-05-25 PROCEDURE — ? FILLERS

## 2023-05-25 PROCEDURE — ? BOTOX

## 2023-05-25 PROCEDURE — ? DYSPORT

## 2023-05-25 ASSESSMENT — LOCATION DETAILED DESCRIPTION DERM
LOCATION DETAILED: LEFT RADIAL DORSAL HAND
LOCATION DETAILED: RIGHT RADIAL DORSAL HAND
LOCATION DETAILED: LEFT CHIN
LOCATION DETAILED: RIGHT CENTRAL BUCCAL CHEEK
LOCATION DETAILED: RIGHT CHIN
LOCATION DETAILED: RIGHT INFERIOR MEDIAL MALAR CHEEK
LOCATION DETAILED: LEFT LATERAL CANTHUS
LOCATION DETAILED: NASAL ROOT
LOCATION DETAILED: LEFT INFERIOR MEDIAL MALAR CHEEK
LOCATION DETAILED: GLABELLA
LOCATION DETAILED: RIGHT INFERIOR TEMPLE
LOCATION DETAILED: LEFT FOREHEAD
LOCATION DETAILED: RIGHT FOREHEAD
LOCATION DETAILED: LEFT CENTRAL BUCCAL CHEEK

## 2023-05-25 ASSESSMENT — LOCATION ZONE DERM
LOCATION ZONE: NOSE
LOCATION ZONE: HAND
LOCATION ZONE: EYELID
LOCATION ZONE: FACE

## 2023-05-25 ASSESSMENT — LOCATION SIMPLE DESCRIPTION DERM
LOCATION SIMPLE: GLABELLA
LOCATION SIMPLE: RIGHT CHEEK
LOCATION SIMPLE: LEFT FOREHEAD
LOCATION SIMPLE: CHIN
LOCATION SIMPLE: RIGHT HAND
LOCATION SIMPLE: LEFT EYELID
LOCATION SIMPLE: NOSE
LOCATION SIMPLE: RIGHT FOREHEAD
LOCATION SIMPLE: LEFT HAND
LOCATION SIMPLE: LEFT CHEEK
LOCATION SIMPLE: RIGHT TEMPLE

## 2023-05-25 NOTE — PROCEDURE: OTHER
Detail Level: Detailed
Render Risk Assessment In Note?: yes
Note Text (......Xxx Chief Complaint.): This diagnosis correlates with the
Other (Free Text): Discussed Filler to hands at next visit. Quoted 4 syringes of [employer].

## 2023-05-25 NOTE — PROCEDURE: DYSPORT
Show Forehead Units: Yes
Additional Area 1 Units: 0
Dilution (U/ 0.1cc): 1.5
Post-Care Instructions: Patient instructed to not lie down for 4 hours and limit physical activity for 24 hours. Patient instructed not to travel by airplane for 48 hours.
Show Right And Left Pupillary Line Units: No
Additional Area 2 Location: eyebrows
Additional Area 5 Location: Right zygoma
Detail Level: Simple
Periorbital Skin Units: 60
Expiration Date (Month Year): 8/31/2023
Additional Area 6 Location: around Mouth
Price (Use Numbers Only, No Special Characters Or $): 4.55
Additional Area 3 Location: Neck
Additional Area 4 Location: Bunny Lines
Consent: Written consent obtained. Risks include but not limited to lid/brow ptosis, bruising, swelling, diplopia, temporary effect, incomplete chemical denervation.
Lot #: [id number]
Additional Area 1 Location: chin

## 2023-05-25 NOTE — PROCEDURE: FILLERS
Additional Area 4 Volume In Cc: 0
Map Statment: See attached Map
Include Cannula Information In Note?: No
Additional Area 5 Location: Penis
Additional Area 1 Location: Upper Cutaneous Lip
Include Cannula Length?: 1.5 inch
Include Documentation That Aspiration Was Performed Prior To Injecting Filler:: Yes
Additional Area 2 Location: Chin
Lot #: [id number]
Expiration Date (Month Year): 12/23/23
Aspiration Statement: Aspiration was performed prior to injecting site with filler.
Lot #: [id number]
Filler: Juvederm Voluma XC
Detail Level: Simple
Expiration Date (Month Year): 4/13/2024
Additional Area 1 Location: glabella
Include Cannula Size?: 25G
Additional Area 2 Location: Forehead
Nasolabial Folds Filler Volume In Cc: 1
Lot #: [id number]
Price (Use Numbers Only, No Special Characters Or $): 1600
Additional Area 3 Location: hands
Expiration Date (Month Year): 4/30/22
Anesthesia Type: 1% lidocaine without epinephrine

## 2023-05-25 NOTE — PROCEDURE: BOTOX
Additional Area 4 Units: 0
Show Mentalis Units: No
Expiration Date (Month Year): 11/2025
Additional Area 6 Location: Chin Zendejas
Show Forehead Units: Yes
Forehead Units: 12
Additional Area 1 Location: Eyebrows
Lot #: [id number]
Price (Use Numbers Only, No Special Characters Or $): 14
Additional Area 5 Location: Ailin
Dilution (U/0.1 Cc): 2.5
Glabellar Complex Units: 16
Additional Area 4 Location: Chin
Consent: Written consent obtained. Risks include but not limited to lid/brow ptosis, bruising, swelling, diplopia, temporary effect, incomplete chemical denervation.
Post-Care Instructions: Patient instructed to not lie down for 4 hours and limit physical activity for 24 hours. Patient instructed not to travel by airplane for 48 hours.
Additional Area 6 Units: 4
Additional Area 3 Location: lip
Additional Area 4 Units: 6
Incrementing Botox Units: By 0.5 Units
Detail Level: Simple
Additional Area 2 Location: Neck

## 2023-11-10 ENCOUNTER — APPOINTMENT (RX ONLY)
Dept: URBAN - METROPOLITAN AREA CLINIC 12 | Facility: CLINIC | Age: 73
Setting detail: DERMATOLOGY
End: 2023-11-10

## 2023-11-10 DIAGNOSIS — Z41.9 ENCOUNTER FOR PROCEDURE FOR PURPOSES OTHER THAN REMEDYING HEALTH STATE, UNSPECIFIED: ICD-10-CM

## 2023-11-10 DIAGNOSIS — L98.8 OTHER SPECIFIED DISORDERS OF THE SKIN AND SUBCUTANEOUS TISSUE: ICD-10-CM

## 2023-11-10 PROCEDURE — ? BOTOX

## 2023-11-10 PROCEDURE — ? FILLERS

## 2023-11-10 PROCEDURE — ? DYSPORT

## 2023-11-10 PROCEDURE — ? COSMETIC CONSULTATION: FILLERS

## 2023-11-10 ASSESSMENT — LOCATION SIMPLE DESCRIPTION DERM
LOCATION SIMPLE: LEFT CHEEK
LOCATION SIMPLE: RIGHT CHEEK
LOCATION SIMPLE: RIGHT EYELID
LOCATION SIMPLE: RIGHT FOREHEAD
LOCATION SIMPLE: LEFT EYELID
LOCATION SIMPLE: CHIN
LOCATION SIMPLE: LEFT FOREHEAD
LOCATION SIMPLE: GLABELLA

## 2023-11-10 ASSESSMENT — LOCATION DETAILED DESCRIPTION DERM
LOCATION DETAILED: RIGHT CENTRAL BUCCAL CHEEK
LOCATION DETAILED: RIGHT FOREHEAD
LOCATION DETAILED: LEFT FOREHEAD
LOCATION DETAILED: LEFT INFERIOR MEDIAL MALAR CHEEK
LOCATION DETAILED: GLABELLA
LOCATION DETAILED: RIGHT INFERIOR MEDIAL MALAR CHEEK
LOCATION DETAILED: LEFT CHIN
LOCATION DETAILED: LEFT CENTRAL BUCCAL CHEEK
LOCATION DETAILED: LEFT LATERAL CANTHUS
LOCATION DETAILED: RIGHT LATERAL CANTHUS

## 2023-11-10 ASSESSMENT — LOCATION ZONE DERM
LOCATION ZONE: FACE
LOCATION ZONE: EYELID

## 2023-11-10 NOTE — PROCEDURE: FILLERS
Additional Area 4 Volume In Cc: 0
Map Statment: See attached Map
Include Cannula Information In Note?: No
Additional Area 5 Location: Penis
Additional Area 1 Location: Upper Cutaneous Lip
Include Cannula Length?: 1.5 inch
Include Documentation That Aspiration Was Performed Prior To Injecting Filler:: Yes
Additional Area 2 Location: Chin
Lot #: [id number]
Expiration Date (Month Year): 3/22/2024
Aspiration Statement: Aspiration was performed prior to injecting site with filler.
Lot #: [id number]
Filler: Juvederm Voluma XC
Detail Level: Simple
Expiration Date (Month Year): 4/13/2024
Additional Area 1 Location: glabella
Include Cannula Size?: 25G
Cheeks Filler Volume In Cc: 2
Additional Area 2 Location: Forehead
Lot #: [id number]
Price (Use Numbers Only, No Special Characters Or $): 6207
Additional Area 3 Location: hands
Marionette Lines Filler  Volume In Cc: 1
Expiration Date (Month Year): 4/30/22
Anesthesia Type: 1% lidocaine without epinephrine

## 2023-11-10 NOTE — PROCEDURE: BOTOX
Show Lateral Platysmal Band Units: Yes
Show Ucl Units: No
Lateral Platysmal Bands Units: 0
Dilution (U/0.1 Cc): 2.5
Additional Area 2 Location: Neck
Additional Area 6 Location: Chin Zendejas
Consent: Written consent obtained for 2023.  Risks include but not limited to lid/brow ptosis, bruising, swelling, diplopia, temporary effect, incomplete chemical denervation.
Additional Area 1 Location: Eyebrows
Forehead Units: 12
Additional Area 5 Location: Ailin
Post-Care Instructions: Patient instructed to not lie down for 4 hours and limit physical activity for 24 hours. Patient instructed not to travel by airplane for 48 hours.
Incrementing Botox Units: By 0.5 Units
Glabellar Complex Units: 20
Additional Area 4 Location: Chin
Detail Level: Simple
Expiration Date (Month Year): 04/2026
Additional Area 3 Location: Upper Cutaneous Lip
Lot #: [id number]
Price (Use Numbers Only, No Special Characters Or $): 13

## 2023-11-10 NOTE — PROCEDURE: DYSPORT
Show Additional Area 4: Yes
Lateral Platysmal Bands Units: 0
Additional Area 1 Location: chin
Show Right And Left Pupillary Line Units: No
Additional Area 4 Location: Bunny Lines
Consent: Written consent obtained. Risks include but not limited to lid/brow ptosis, bruising, swelling, diplopia, temporary effect, incomplete chemical denervation.
Lot #: [id number]
Detail Level: Simple
Additional Area 2 Location: eyebrows
Additional Area 4 Units: 10
Dilution (U/ 0.1cc): 1.5
Additional Area 5 Location: Right zygoma
Post-Care Instructions: Patient instructed to not lie down for 4 hours and limit physical activity for 24 hours. Patient instructed not to travel by airplane for 48 hours.
Periorbital Skin Units: 60
Expiration Date (Month Year): 4/30/2025
Additional Area 3 Location: Neck
Price (Use Numbers Only, No Special Characters Or $): 3.75
Additional Area 6 Location: around Mouth

## 2023-12-07 ENCOUNTER — APPOINTMENT (RX ONLY)
Dept: URBAN - METROPOLITAN AREA CLINIC 12 | Facility: CLINIC | Age: 73
Setting detail: DERMATOLOGY
End: 2023-12-07

## 2023-12-07 DIAGNOSIS — Z41.9 ENCOUNTER FOR PROCEDURE FOR PURPOSES OTHER THAN REMEDYING HEALTH STATE, UNSPECIFIED: ICD-10-CM

## 2023-12-07 PROCEDURE — ? FILLERS

## 2023-12-07 ASSESSMENT — LOCATION DETAILED DESCRIPTION DERM
LOCATION DETAILED: RIGHT CENTRAL MANDIBULAR CHEEK
LOCATION DETAILED: LEFT CENTRAL MANDIBULAR CHEEK

## 2023-12-07 ASSESSMENT — LOCATION SIMPLE DESCRIPTION DERM
LOCATION SIMPLE: RIGHT CHEEK
LOCATION SIMPLE: LEFT CHEEK

## 2023-12-07 ASSESSMENT — LOCATION ZONE DERM: LOCATION ZONE: FACE

## 2023-12-07 NOTE — PROCEDURE: FILLERS
Additional Area 1 Location: chin
Temple Hollows Filler  Volume In Cc: 0
Additional Area 5 Location: Penis
Lot #: [id number]
Detail Level: Simple
Include Cannula Information In Note?: No
Lot #: [id number]
Include Cannula Size?: 25G
Expiration Date (Month Year): 4/30/22
Expiration Date (Month Year): 10/2/24
Additional Area 1 Location: glabella
Price (Use Numbers Only, No Special Characters Or $): 1800
Include Cannula Length?: 1.5 inch
Include Cannula Information In Note?: Yes
Additional Area 2 Location: Forehead
Additional Area 1 Location: Upper Cutaneous Lip
Filler: Juvederm Volux XC
Additional Area 3 Location: scalp
Map Statment: See attached Map
Jawline Filler Volume In Cc: 2
Lot #: [id number]
Anesthesia Type: 1% lidocaine with epinephrine
Expiration Date (Month Year): 4/30/24
Aspiration Statement: Aspiration was performed prior to injecting site with filler.

## 2024-05-23 ENCOUNTER — APPOINTMENT (RX ONLY)
Dept: URBAN - METROPOLITAN AREA CLINIC 12 | Facility: CLINIC | Age: 74
Setting detail: DERMATOLOGY
End: 2024-05-23

## 2024-05-23 DIAGNOSIS — L98.8 OTHER SPECIFIED DISORDERS OF THE SKIN AND SUBCUTANEOUS TISSUE: ICD-10-CM

## 2024-05-23 DIAGNOSIS — Z41.9 ENCOUNTER FOR PROCEDURE FOR PURPOSES OTHER THAN REMEDYING HEALTH STATE, UNSPECIFIED: ICD-10-CM

## 2024-05-23 PROCEDURE — ? FILLERS

## 2024-05-23 PROCEDURE — ? ADDITIONAL NOTES

## 2024-05-23 PROCEDURE — ? DAXXIFY

## 2024-05-23 ASSESSMENT — LOCATION DETAILED DESCRIPTION DERM
LOCATION DETAILED: RIGHT INFERIOR TEMPLE
LOCATION DETAILED: RIGHT CHIN
LOCATION DETAILED: LEFT INFERIOR TEMPLE
LOCATION DETAILED: LEFT CHIN
LOCATION DETAILED: GLABELLA
LOCATION DETAILED: LEFT INFERIOR CENTRAL MALAR CHEEK
LOCATION DETAILED: SUPERIOR MID FOREHEAD
LOCATION DETAILED: RIGHT INFERIOR CENTRAL MALAR CHEEK
LOCATION DETAILED: LEFT LOWER CUTANEOUS LIP

## 2024-05-23 ASSESSMENT — LOCATION SIMPLE DESCRIPTION DERM
LOCATION SIMPLE: GLABELLA
LOCATION SIMPLE: CHIN
LOCATION SIMPLE: LEFT CHEEK
LOCATION SIMPLE: RIGHT TEMPLE
LOCATION SIMPLE: RIGHT CHEEK
LOCATION SIMPLE: LEFT LIP
LOCATION SIMPLE: SUPERIOR FOREHEAD
LOCATION SIMPLE: LEFT TEMPLE

## 2024-05-23 ASSESSMENT — LOCATION ZONE DERM
LOCATION ZONE: LIP
LOCATION ZONE: FACE

## 2024-05-23 NOTE — PROCEDURE: ADDITIONAL NOTES
Render Risk Assessment In Note?: no
Detail Level: Detailed
Additional Notes: First time receiving Daxxify. 20 unit rebate - 120 total injected.
Additional Notes: Discussed consideration for doing  temples at next appointment.

## 2024-05-23 NOTE — PROCEDURE: DAXXIFY
Post-Care Instructions: Patient instructed to not lie down for 4 hours and limit physical activity for 24 hours.
Show Additional Area 4: Yes
Depressor Anguli Oris Units: 0
Periorbital Skin Units: 40
Price Per Unit In $ (Use Numbers Only, No Text Please.): 6.50
Dilution (U/0.1 Cc): 1.2
Additional Area 3 Units: 16
Expiration Date (Month Year): 3/2025
Show Mentalis Units: No
Additional Area 2 Location: Neck
Consent: Written consent obtained. Risks include but not limited to lid/brow ptosis, bruising, swelling, diplopia, temporary effect, incomplete chemical denervation.
Forehead Units: 24
Detail Level: Detailed
Bill Summary Price Listed Below, Or Bill Total Of Units X Price Per Unit?: Bill Summary Price Below
Lot #: [id number]
Additional Area 1 Location: right zygoma
Price (Use Numbers Only, No Special Characters Or $): 7.50
Additional Area 3 Location: Chin

## 2024-05-23 NOTE — PROCEDURE: FILLERS
Marionette Lines Filler Volume In Cc: 1
Nasolabial Folds Filler Volume In Cc: 0
Include Cannula Information In Note?: No
Additional Area 2 Location: Penis
Lot #: [id number]
Include Cannula Size?: 25G
Price (Use Numbers Only, No Special Characters Or $): 1300
Additional Area 1 Location: chin
Use Map Statement For Sites (Optional): Yes
Include Cannula Length?: 1.5 inch
Map Statment: See attached Map
Additional Area 3 Location: Upper Cutaneous Iip
Lot #: [id number]
Lot #: [id number]
Filler: Juvederm Voluma XC
Additional Area 2 Location: Forehead
Expiration Date (Month Year): 4/30/22
Additional Area 1 Location: Upper Cutaneous Lip
Expiration Date (Month Year): 10/31/24
Additional Area 1 Location: glabella
Detail Level: Simple
Anesthesia Type: 1% lidocaine without epinephrine
Aspiration Statement: Aspiration was performed prior to injecting site with filler.
Expiration Date (Month Year): 9/3/24

## 2024-10-24 ENCOUNTER — APPOINTMENT (RX ONLY)
Dept: URBAN - METROPOLITAN AREA CLINIC 12 | Facility: CLINIC | Age: 74
Setting detail: DERMATOLOGY
End: 2024-10-24

## 2024-10-24 DIAGNOSIS — L98.8 OTHER SPECIFIED DISORDERS OF THE SKIN AND SUBCUTANEOUS TISSUE: ICD-10-CM

## 2024-10-24 DIAGNOSIS — Z41.9 ENCOUNTER FOR PROCEDURE FOR PURPOSES OTHER THAN REMEDYING HEALTH STATE, UNSPECIFIED: ICD-10-CM

## 2024-10-24 PROCEDURE — ? FILLERS

## 2024-10-24 PROCEDURE — ? DAXXIFY

## 2024-10-24 ASSESSMENT — LOCATION DETAILED DESCRIPTION DERM: LOCATION DETAILED: GLABELLA

## 2024-10-24 ASSESSMENT — LOCATION ZONE DERM: LOCATION ZONE: FACE

## 2024-10-24 ASSESSMENT — LOCATION SIMPLE DESCRIPTION DERM: LOCATION SIMPLE: GLABELLA

## 2024-10-24 NOTE — PROCEDURE: FILLERS
Jawline Filler Volume In Cc: 0
Include Cannula Information In Note?: No
Aspiration Statement: Aspiration was performed prior to injecting site with filler.
Additional Area 1 Location: glabella
Lot #: [id number]
Lot #: [id number]
Detail Level: Simple
Expiration Date (Month Year): 4/30/22
Expiration Date (Month Year): 12/28/24
Filler: Juvederm Volux XC
Include Cannula Size?: 25G
Additional Area 2 Location: Forehead
Include Cannula Information In Note?: Yes
Price (Use Numbers Only, No Special Characters Or $): 1800
Additional Area 3 Location: Upper Cutaneous Iip
Nasolabial Folds Filler Volume In Cc: 1
Additional Area 1 Location: Upper Cutaneous Lip
Additional Area 1 Location: chin
Anesthesia Type: 1% lidocaine without epinephrine
Additional Area 5 Location: Penis
Map Statment: See attached Map
Include Cannula Length?: 1.5 inch
Lot #: [id number]
Expiration Date (Month Year): 2025/01/15

## 2024-10-24 NOTE — PROCEDURE: DAXXIFY
Show Additional Area 2: Yes
Price (Use Numbers Only, No Special Characters Or $): 7.50
R Brow Units: 0
Additional Area 1 Location: Eyebrows
Glabellar Complex Units: 40
Show Right And Left Brow Units: No
Bill Summary Price Listed Below, Or Bill Total Of Units X Price Per Unit?: Bill Summary Price Below
Consent: Written consent obtained. Risks include but not limited to lid/brow ptosis, bruising, swelling, diplopia, temporary effect, incomplete chemical denervation.
Lot #: [id number]
Additional Area 3 Location: Chin
Expiration Date (Month Year): 9/2025
Price Per Unit In $ (Use Numbers Only, No Text Please.): 6.50
Post-Care Instructions: Patient instructed to not lie down for 4 hours and limit physical activity for 24 hours.
Dilution (U/0.1 Cc): 1.2
Forehead Units: 20
Additional Area 2 Location: Neck
Detail Level: Simple

## 2025-04-10 ENCOUNTER — APPOINTMENT (OUTPATIENT)
Dept: URBAN - METROPOLITAN AREA CLINIC 12 | Facility: CLINIC | Age: 75
Setting detail: DERMATOLOGY
End: 2025-04-10

## 2025-04-10 DIAGNOSIS — L98.8 OTHER SPECIFIED DISORDERS OF THE SKIN AND SUBCUTANEOUS TISSUE: ICD-10-CM

## 2025-04-10 DIAGNOSIS — Z41.9 ENCOUNTER FOR PROCEDURE FOR PURPOSES OTHER THAN REMEDYING HEALTH STATE, UNSPECIFIED: ICD-10-CM

## 2025-04-10 PROCEDURE — ? BOTOX

## 2025-04-10 PROCEDURE — ? FILLERS

## 2025-04-10 ASSESSMENT — LOCATION DETAILED DESCRIPTION DERM
LOCATION DETAILED: GLABELLA
LOCATION DETAILED: RIGHT LATERAL CANTHUS
LOCATION DETAILED: LEFT LATERAL CANTHUS
LOCATION DETAILED: LEFT CENTRAL BUCCAL CHEEK
LOCATION DETAILED: LEFT FOREHEAD
LOCATION DETAILED: LEFT CHIN
LOCATION DETAILED: RIGHT CENTRAL BUCCAL CHEEK
LOCATION DETAILED: RIGHT FOREHEAD
LOCATION DETAILED: RIGHT CHIN

## 2025-04-10 ASSESSMENT — LOCATION SIMPLE DESCRIPTION DERM
LOCATION SIMPLE: GLABELLA
LOCATION SIMPLE: RIGHT CHEEK
LOCATION SIMPLE: LEFT EYELID
LOCATION SIMPLE: CHIN
LOCATION SIMPLE: LEFT FOREHEAD
LOCATION SIMPLE: RIGHT EYELID
LOCATION SIMPLE: LEFT CHEEK
LOCATION SIMPLE: RIGHT FOREHEAD

## 2025-04-10 ASSESSMENT — LOCATION ZONE DERM
LOCATION ZONE: EYELID
LOCATION ZONE: FACE

## 2025-04-10 NOTE — PROCEDURE: FILLERS
Vermilion Lips Filler Volume In Cc: 0
Include Cannula Size?: 25G
Additional Area 2 Location: Penis
Lot #: [id number]
Aspiration Statement: Aspiration was performed prior to injecting site with filler.
Marionette Lines Filler Volume In Cc: 1
Expiration Date (Month Year): 4/30/22
Detail Level: Simple
Lot #: [id number]
Include Cannula Length?: 1.5 inch
Additional Area 1 Location: Upper Cutaneous Lip
Expiration Date (Month Year): 6/4/26
Price (Use Numbers Only, No Special Characters Or $): 113
Include Cannula Information In Note?: No
Use Map Statement For Sites (Optional): Yes
Additional Area 2 Location: Chin
Additional Area 1 Location: glabella
Lot #: [id number]
Filler: Juvederm Voluma XC
Additional Area 2 Location: Forehead
Map Statment: See attached Map
Expiration Date (Month Year): 2025/02/16
Additional Area 3 Location: Upper Cutaneous Iip
Anesthesia Type: 1% lidocaine without epinephrine

## 2025-04-10 NOTE — PROCEDURE: MIPS QUALITY
Additional Notes: I have counseled the patient for several minutes regarding options for smoking cesstion
Detail Level: Detailed
Quality 226: Preventive Care And Screening: Tobacco Use: Screening And Cessation Intervention: Patient screened for tobacco use, is a smoker AND received Cessation Counseling within measurement period or in the six months prior to the measurement period

## 2025-04-10 NOTE — PROCEDURE: BOTOX
Depressor Anguli Oris Units: 0
Show Right And Left Pupillary Line Units: No
Show Forehead Units: Yes
Additional Area 3 Location: Upper/Lower Cutaneous Lip
Dilution (U/0.1 Cc): 2.5
Forehead Units: 10
Additional Area 4 Units: 12
Additional Area 2 Location: Neck
Consent: Written consent obtained for 2025.  Risks include but not limited to lid/brow ptosis, bruising, swelling, diplopia, temporary effect, incomplete chemical denervation.
Additional Area 1 Location: Eyebrows
Post-Care Instructions: Patient instructed to not lie down for 4 hours and limit physical activity for 24 hours. Patient instructed not to travel by airplane for 48 hours.
Incrementing Botox Units: By 0.5 Units
Periorbital Skin Units: 16
Additional Area 1 Units: 4
Additional Area 5 Location: Right Zygoma
Expiration Date (Month Year): 2027/05
Detail Level: Simple
Additional Area 4 Location: Chin
Lot #: [id number]
Price (Use Numbers Only, No Special Characters Or $): 14.00
Additional Area 6 Location: Chin Zendejas